# Patient Record
(demographics unavailable — no encounter records)

---

## 2025-04-28 NOTE — ASSESSMENT
[FreeTextEntry1] : Hot tub folliculitis - Advised usually resolves without treatment, but if not improved in 7 days then may start Cipro 500mg BID x 7 days

## 2025-04-28 NOTE — HISTORY OF PRESENT ILLNESS
[FreeTextEntry8] : patient is complaining of a pimple like  rash all over her body. patient states she was in a hot tub 04/24/2025 night and noticed the rash the next day and not getting any better. She has itchiness and taking Benadryl. No oozing or drainage. Feels well. No fevers.

## 2025-04-28 NOTE — REVIEW OF SYSTEMS
[Itching] : Itching [Skin Rash] : skin rash Carac Counseling:  I discussed with the patient the risks of Carac including but not limited to erythema, scaling, itching, weeping, crusting, and pain.

## 2025-04-28 NOTE — PHYSICAL EXAM
[Normal] : no acute distress, well nourished, well developed and well-appearing [Normal Affect] : the affect was normal [Alert and Oriented x3] : oriented to person, place, and time [Normal Insight/Judgement] : insight and judgment were intact [de-identified] : +pimple like rash posterior ears, chest, back and stomach